# Patient Record
Sex: FEMALE | Race: WHITE | Employment: PART TIME | URBAN - METROPOLITAN AREA
[De-identification: names, ages, dates, MRNs, and addresses within clinical notes are randomized per-mention and may not be internally consistent; named-entity substitution may affect disease eponyms.]

---

## 2020-01-08 ENCOUNTER — TRANSFERRED RECORDS (OUTPATIENT)
Dept: MULTI SPECIALTY CLINIC | Facility: CLINIC | Age: 25
End: 2020-01-08

## 2020-01-08 LAB — PAP-ABSTRACT: NORMAL

## 2021-04-08 ENCOUNTER — OFFICE VISIT (OUTPATIENT)
Dept: OBGYN | Facility: OTHER | Age: 26
End: 2021-04-08
Payer: COMMERCIAL

## 2021-04-08 ENCOUNTER — MEDICAL CORRESPONDENCE (OUTPATIENT)
Dept: HEALTH INFORMATION MANAGEMENT | Facility: CLINIC | Age: 26
End: 2021-04-08

## 2021-04-08 ENCOUNTER — ANCILLARY PROCEDURE (OUTPATIENT)
Dept: ULTRASOUND IMAGING | Facility: OTHER | Age: 26
End: 2021-04-08
Attending: OBSTETRICS & GYNECOLOGY
Payer: COMMERCIAL

## 2021-04-08 ENCOUNTER — TELEPHONE (OUTPATIENT)
Facility: CLINIC | Age: 26
End: 2021-04-08

## 2021-04-08 VITALS
HEIGHT: 67 IN | WEIGHT: 116.25 LBS | DIASTOLIC BLOOD PRESSURE: 68 MMHG | SYSTOLIC BLOOD PRESSURE: 120 MMHG | BODY MASS INDEX: 18.25 KG/M2

## 2021-04-08 DIAGNOSIS — N64.52 NIPPLE DISCHARGE, BLOODY: ICD-10-CM

## 2021-04-08 DIAGNOSIS — Z65.4 VICTIM OF HUMAN TRAFFICKING: ICD-10-CM

## 2021-04-08 DIAGNOSIS — N93.9 ABNORMAL UTERINE BLEEDING: Primary | ICD-10-CM

## 2021-04-08 DIAGNOSIS — K62.5 RECTAL BLEEDING: ICD-10-CM

## 2021-04-08 DIAGNOSIS — N93.9 ABNORMAL UTERINE BLEEDING: ICD-10-CM

## 2021-04-08 LAB
PROLACTIN SERPL-MCNC: 18 UG/L (ref 3–27)
TSH SERPL DL<=0.005 MIU/L-ACNC: 1.06 MU/L (ref 0.4–4)

## 2021-04-08 PROCEDURE — 36415 COLL VENOUS BLD VENIPUNCTURE: CPT | Performed by: OBSTETRICS & GYNECOLOGY

## 2021-04-08 PROCEDURE — 84146 ASSAY OF PROLACTIN: CPT | Performed by: OBSTETRICS & GYNECOLOGY

## 2021-04-08 PROCEDURE — 99205 OFFICE O/P NEW HI 60 MIN: CPT | Performed by: OBSTETRICS & GYNECOLOGY

## 2021-04-08 PROCEDURE — 84443 ASSAY THYROID STIM HORMONE: CPT | Performed by: OBSTETRICS & GYNECOLOGY

## 2021-04-08 RX ORDER — LORATADINE 10 MG/1
10 TABLET ORAL DAILY PRN
COMMUNITY
Start: 2019-04-25

## 2021-04-08 RX ORDER — FLUCONAZOLE 150 MG/1
TABLET ORAL
COMMUNITY
Start: 2020-11-04 | End: 2021-04-20

## 2021-04-08 RX ORDER — LAMOTRIGINE 100 MG/1
100 TABLET ORAL
COMMUNITY
Start: 2021-02-24 | End: 2021-04-20

## 2021-04-08 RX ORDER — BUSPIRONE HYDROCHLORIDE 10 MG/1
20 TABLET ORAL EVERY MORNING
COMMUNITY
Start: 2020-10-01 | End: 2021-10-01

## 2021-04-08 RX ORDER — PRAZOSIN HYDROCHLORIDE 1 MG/1
CAPSULE ORAL
COMMUNITY
Start: 2021-03-08 | End: 2021-04-20

## 2021-04-08 RX ORDER — EPINEPHRINE 0.3 MG/.3ML
0.3 INJECTION SUBCUTANEOUS
COMMUNITY
Start: 2020-05-11

## 2021-04-08 RX ORDER — AMITRIPTYLINE HYDROCHLORIDE 10 MG/1
TABLET ORAL
COMMUNITY
Start: 2021-02-24 | End: 2021-04-20

## 2021-04-08 RX ORDER — LAMOTRIGINE 100 MG/1
100 TABLET ORAL DAILY
COMMUNITY
Start: 2021-03-28

## 2021-04-08 RX ORDER — TRIAMCINOLONE ACETONIDE 1 MG/G
CREAM TOPICAL
COMMUNITY
Start: 2019-04-25

## 2021-04-08 RX ORDER — MULTIVITAMIN,THERAPEUTIC
1 TABLET ORAL
COMMUNITY

## 2021-04-08 RX ORDER — BUSPIRONE HYDROCHLORIDE 10 MG/1
30 TABLET ORAL AT BEDTIME
COMMUNITY
Start: 2021-03-08

## 2021-04-08 RX ORDER — NALTREXONE HYDROCHLORIDE 50 MG/1
TABLET, FILM COATED ORAL
COMMUNITY
Start: 2021-01-28 | End: 2021-04-20

## 2021-04-08 RX ORDER — PRAZOSIN HYDROCHLORIDE 1 MG/1
1-3 CAPSULE ORAL AT BEDTIME
COMMUNITY
Start: 2021-03-08

## 2021-04-08 RX ORDER — NALTREXONE HYDROCHLORIDE 50 MG/1
5 TABLET, FILM COATED ORAL DAILY
COMMUNITY
Start: 2021-01-28

## 2021-04-08 RX ORDER — EPINEPHRINE 0.3 MG/.3ML
INJECTION SUBCUTANEOUS
COMMUNITY
Start: 2020-05-13 | End: 2021-04-20

## 2021-04-08 ASSESSMENT — ANXIETY QUESTIONNAIRES
2. NOT BEING ABLE TO STOP OR CONTROL WORRYING: NEARLY EVERY DAY
1. FEELING NERVOUS, ANXIOUS, OR ON EDGE: NEARLY EVERY DAY
7. FEELING AFRAID AS IF SOMETHING AWFUL MIGHT HAPPEN: NEARLY EVERY DAY
3. WORRYING TOO MUCH ABOUT DIFFERENT THINGS: NEARLY EVERY DAY
5. BEING SO RESTLESS THAT IT IS HARD TO SIT STILL: NEARLY EVERY DAY
GAD7 TOTAL SCORE: 19
IF YOU CHECKED OFF ANY PROBLEMS ON THIS QUESTIONNAIRE, HOW DIFFICULT HAVE THESE PROBLEMS MADE IT FOR YOU TO DO YOUR WORK, TAKE CARE OF THINGS AT HOME, OR GET ALONG WITH OTHER PEOPLE: VERY DIFFICULT
6. BECOMING EASILY ANNOYED OR IRRITABLE: SEVERAL DAYS

## 2021-04-08 ASSESSMENT — PATIENT HEALTH QUESTIONNAIRE - PHQ9
SUM OF ALL RESPONSES TO PHQ QUESTIONS 1-9: 26
5. POOR APPETITE OR OVEREATING: NEARLY EVERY DAY

## 2021-04-08 ASSESSMENT — MIFFLIN-ST. JEOR: SCORE: 1307.55
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
## 2021-04-09 ASSESSMENT — ANXIETY QUESTIONNAIRES: GAD7 TOTAL SCORE: 19

## 2021-04-15 NOTE — TELEPHONE ENCOUNTER
Received imaging report from [facility].  Per Patria she would like to see the images.  Called [facility] back and spoke with there imaging department and they said they will send images through "[employer]" and then to [employer] system.  They stated that we should received within 1-2 hours and will call back if there is any issues with it.    Kassy Gonzalez, LAUOR  April 15, 2021

## 2021-04-20 ENCOUNTER — HOSPITAL ENCOUNTER (EMERGENCY)
Facility: CLINIC | Age: 26
Discharge: HOME | End: 2021-04-20
Attending: EMERGENCY MEDICINE | Admitting: EMERGENCY MEDICINE
Payer: COMMERCIAL

## 2021-04-20 ENCOUNTER — OFFICE VISIT (OUTPATIENT)
Dept: SURGERY | Facility: CLINIC | Age: 26
End: 2021-04-20
Attending: OBSTETRICS & GYNECOLOGY
Payer: COMMERCIAL

## 2021-04-20 VITALS
RESPIRATION RATE: 16 BRPM | DIASTOLIC BLOOD PRESSURE: 67 MMHG | WEIGHT: 116.2 LBS | BODY MASS INDEX: 18.45 KG/M2 | SYSTOLIC BLOOD PRESSURE: 105 MMHG | TEMPERATURE: 97.7 F | OXYGEN SATURATION: 99 % | HEART RATE: 76 BPM

## 2021-04-20 VITALS
WEIGHT: 115.1 LBS | TEMPERATURE: 96.8 F | HEIGHT: 67 IN | BODY MASS INDEX: 18.07 KG/M2 | SYSTOLIC BLOOD PRESSURE: 106 MMHG | DIASTOLIC BLOOD PRESSURE: 70 MMHG

## 2021-04-20 DIAGNOSIS — T74.21XS SEXUAL ASSAULT OF ADULT, SEQUELA: ICD-10-CM

## 2021-04-20 DIAGNOSIS — K62.5 RECTAL BLEEDING: ICD-10-CM

## 2021-04-20 DIAGNOSIS — N64.52 NIPPLE DISCHARGE, BLOODY: Primary | ICD-10-CM

## 2021-04-20 DIAGNOSIS — T74.21XA SEXUAL ASSAULT OF ADULT, INITIAL ENCOUNTER: ICD-10-CM

## 2021-04-20 DIAGNOSIS — T14.8XXA TRAUMATIC ECCHYMOSIS OF MULTIPLE SITES: ICD-10-CM

## 2021-04-20 PROCEDURE — 99282 EMERGENCY DEPT VISIT SF MDM: CPT | Performed by: EMERGENCY MEDICINE

## 2021-04-20 PROCEDURE — 99204 OFFICE O/P NEW MOD 45 MIN: CPT | Performed by: SURGERY

## 2021-04-20 PROCEDURE — 99284 EMERGENCY DEPT VISIT MOD MDM: CPT | Performed by: EMERGENCY MEDICINE

## 2021-04-20 ASSESSMENT — MIFFLIN-ST. JEOR: SCORE: 1302.41
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
## 2021-04-20 NOTE — DISCHARGE INSTRUCTIONS
Follow-up tomorrow with your psychologist as scheduled.    Return at anytime for concerns.    I will keep you in my prayers!
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
## 2021-05-01 ENCOUNTER — HEALTH MAINTENANCE LETTER (OUTPATIENT)
Age: 26
End: 2021-05-01

## 2021-08-09 ENCOUNTER — TELEPHONE (OUTPATIENT)
Dept: OBGYN | Facility: CLINIC | Age: 26
End: 2021-08-09

## 2021-08-09 NOTE — TELEPHONE ENCOUNTER
Dr. Misty Simon with [facility] calling needing to speak to you urgently before you see this patient at [facility] on Thursday, 8/12/2021. She has some things she needs to discuss with you.[place] call her @ [phone].  Linda Velasco

## 2021-08-11 ENCOUNTER — OFFICE VISIT (OUTPATIENT)
Dept: OBGYN | Facility: CLINIC | Age: 26
End: 2021-08-11
Payer: COMMERCIAL

## 2021-08-11 ENCOUNTER — ANCILLARY PROCEDURE (OUTPATIENT)
Dept: ULTRASOUND IMAGING | Facility: OTHER | Age: 26
End: 2021-08-11
Attending: OBSTETRICS & GYNECOLOGY
Payer: COMMERCIAL

## 2021-08-11 VITALS
BODY MASS INDEX: 17.59 KG/M2 | SYSTOLIC BLOOD PRESSURE: 121 MMHG | WEIGHT: 110.8 LBS | DIASTOLIC BLOOD PRESSURE: 80 MMHG | HEART RATE: 89 BPM

## 2021-08-11 DIAGNOSIS — N93.9 ABNORMAL UTERINE BLEEDING (AUB): ICD-10-CM

## 2021-08-11 DIAGNOSIS — Z32.00 PREGNANCY EXAMINATION OR TEST, PREGNANCY UNCONFIRMED: ICD-10-CM

## 2021-08-11 DIAGNOSIS — Z11.3 SCREEN FOR STD (SEXUALLY TRANSMITTED DISEASE): ICD-10-CM

## 2021-08-11 DIAGNOSIS — N93.9 ABNORMAL UTERINE BLEEDING (AUB): Primary | ICD-10-CM

## 2021-08-11 LAB
CLUE CELLS: ABNORMAL
HCG UR QL: NEGATIVE
HGB BLD-MCNC: 14.8 G/DL (ref 11.7–15.7)
TRICHOMONAS, WET PREP: ABNORMAL
WBC'S/HIGH POWER FIELD, WET PREP: ABNORMAL
YEAST, WET PREP: ABNORMAL

## 2021-08-11 PROCEDURE — 76857 US EXAM PELVIC LIMITED: CPT | Performed by: RADIOLOGY

## 2021-08-11 PROCEDURE — 87210 SMEAR WET MOUNT SALINE/INK: CPT | Performed by: OBSTETRICS & GYNECOLOGY

## 2021-08-11 PROCEDURE — 81025 URINE PREGNANCY TEST: CPT | Performed by: OBSTETRICS & GYNECOLOGY

## 2021-08-11 PROCEDURE — 87591 N.GONORRHOEAE DNA AMP PROB: CPT | Performed by: OBSTETRICS & GYNECOLOGY

## 2021-08-11 PROCEDURE — 99215 OFFICE O/P EST HI 40 MIN: CPT | Performed by: OBSTETRICS & GYNECOLOGY

## 2021-08-11 PROCEDURE — 86780 TREPONEMA PALLIDUM: CPT | Performed by: OBSTETRICS & GYNECOLOGY

## 2021-08-11 PROCEDURE — 36415 COLL VENOUS BLD VENIPUNCTURE: CPT | Performed by: OBSTETRICS & GYNECOLOGY

## 2021-08-11 PROCEDURE — 87491 CHLMYD TRACH DNA AMP PROBE: CPT | Performed by: OBSTETRICS & GYNECOLOGY

## 2021-08-11 PROCEDURE — 87389 HIV-1 AG W/HIV-1&-2 AB AG IA: CPT | Performed by: OBSTETRICS & GYNECOLOGY

## 2021-08-11 PROCEDURE — 85018 HEMOGLOBIN: CPT | Performed by: OBSTETRICS & GYNECOLOGY

## 2021-08-11 PROCEDURE — 86803 HEPATITIS C AB TEST: CPT | Performed by: OBSTETRICS & GYNECOLOGY

## 2021-08-11 RX ORDER — NORETHINDRONE ACETATE AND ETHINYL ESTRADIOL .02; 1 MG/1; MG/1
1 TABLET ORAL DAILY
Qty: 90 TABLET | Refills: 4 | Status: SHIPPED | OUTPATIENT
Start: 2021-08-11
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
## 2021-08-12 LAB
C TRACH DNA SPEC QL NAA+PROBE: NEGATIVE
HCV AB SERPL QL IA: NONREACTIVE
HIV 1+2 AB+HIV1 P24 AG SERPL QL IA: NONREACTIVE
N GONORRHOEA DNA SPEC QL NAA+PROBE: NEGATIVE
T PALLIDUM AB SER QL: NONREACTIVE

## 2021-10-11 ENCOUNTER — HEALTH MAINTENANCE LETTER (OUTPATIENT)
Age: 26
End: 2021-10-11

## 2022-05-22 ENCOUNTER — HEALTH MAINTENANCE LETTER (OUTPATIENT)
Age: 27
End: 2022-05-22

## 2022-09-25 ENCOUNTER — HEALTH MAINTENANCE LETTER (OUTPATIENT)
Age: 27
End: 2022-09-25

## 2023-06-04 ENCOUNTER — HEALTH MAINTENANCE LETTER (OUTPATIENT)
Age: 28
End: 2023-06-04

## 2024-07-20 ENCOUNTER — HEALTH MAINTENANCE LETTER (OUTPATIENT)
Age: 29
End: 2024-07-20